# Patient Record
Sex: MALE | Race: OTHER | HISPANIC OR LATINO | URBAN - METROPOLITAN AREA
[De-identification: names, ages, dates, MRNs, and addresses within clinical notes are randomized per-mention and may not be internally consistent; named-entity substitution may affect disease eponyms.]

---

## 2022-04-20 ENCOUNTER — EMERGENCY (EMERGENCY)
Facility: HOSPITAL | Age: 42
LOS: 1 days | Discharge: HOME | End: 2022-04-20
Admitting: EMERGENCY MEDICINE
Payer: SELF-PAY

## 2022-04-20 VITALS
SYSTOLIC BLOOD PRESSURE: 120 MMHG | HEIGHT: 69 IN | OXYGEN SATURATION: 98 % | DIASTOLIC BLOOD PRESSURE: 74 MMHG | RESPIRATION RATE: 18 BRPM | HEART RATE: 80 BPM | WEIGHT: 190.04 LBS | TEMPERATURE: 97 F
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
## 2022-04-20 DIAGNOSIS — M79.674 PAIN IN RIGHT TOE(S): ICD-10-CM

## 2022-04-20 DIAGNOSIS — S91.211A LACERATION WITHOUT FOREIGN BODY OF RIGHT GREAT TOE WITH DAMAGE TO NAIL, INITIAL ENCOUNTER: ICD-10-CM

## 2022-04-20 DIAGNOSIS — Z23 ENCOUNTER FOR IMMUNIZATION: ICD-10-CM
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
## 2022-04-20 DIAGNOSIS — S92.421A DISPLACED FRACTURE OF DISTAL PHALANX OF RIGHT GREAT TOE, INITIAL ENCOUNTER FOR CLOSED FRACTURE: ICD-10-CM

## 2022-04-20 PROCEDURE — 28490 TREAT BIG TOE FRACTURE: CPT | Mod: 54

## 2022-04-20 PROCEDURE — 99284 EMERGENCY DEPT VISIT MOD MDM: CPT | Mod: 25,57

## 2022-04-20 PROCEDURE — 73630 X-RAY EXAM OF FOOT: CPT | Mod: 26,RT

## 2022-04-20 PROCEDURE — 99053 MED SERV 10PM-8AM 24 HR FAC: CPT

## 2022-04-20 RX ORDER — ACETAMINOPHEN 500 MG
2 TABLET ORAL
Qty: 20 | Refills: 0
Start: 2022-04-20

## 2022-04-20 RX ORDER — TETANUS TOXOID, REDUCED DIPHTHERIA TOXOID AND ACELLULAR PERTUSSIS VACCINE, ADSORBED 5; 2.5; 8; 8; 2.5 [IU]/.5ML; [IU]/.5ML; UG/.5ML; UG/.5ML; UG/.5ML
0.5 SUSPENSION INTRAMUSCULAR ONCE
Refills: 0 | Status: COMPLETED | OUTPATIENT
Start: 2022-04-20 | End: 2022-04-20

## 2022-04-20 RX ORDER — OXYCODONE AND ACETAMINOPHEN 5; 325 MG/1; MG/1
1 TABLET ORAL ONCE
Refills: 0 | Status: DISCONTINUED | OUTPATIENT
Start: 2022-04-20 | End: 2022-04-20

## 2022-04-20 RX ADMIN — OXYCODONE AND ACETAMINOPHEN 1 TABLET(S): 5; 325 TABLET ORAL at 04:18

## 2022-04-20 RX ADMIN — TETANUS TOXOID, REDUCED DIPHTHERIA TOXOID AND ACELLULAR PERTUSSIS VACCINE, ADSORBED 0.5 MILLILITER(S): 5; 2.5; 8; 8; 2.5 SUSPENSION INTRAMUSCULAR at 04:18

## 2022-04-20 RX ADMIN — Medication 1 TABLET(S): at 05:20

## 2022-04-20 NOTE — ED PROVIDER NOTE - OBJECTIVE STATEMENT
40 yo M with no known PMHx, last tetanus unknown, presenting c/o R great toe pain and injury x 1 hr.  Pt reports feeling frustrated and kicked a chair last night, sustained injury to the R great toe with bleeding and discoloration of the nail subsequently.  Now with pain, difficulty weight bearing and persistent bleeding. Denies head trauma, LOC, focal weakness, trauma to other sites, numbness, tingling, CP, SOB, N/V, change in ROM and malaise.

## 2022-04-20 NOTE — ED PROVIDER NOTE - PATIENT PORTAL LINK FT
You can access the FollowMyHealth Patient Portal offered by [facility] by registering at the following website: [email or web address]. By joining [employer]’s FollowMyHealth portal, you will also be able to view your health information using other applications (apps) compatible with our system.

## 2022-04-20 NOTE — ED ADULT TRIAGE NOTE - CHIEF COMPLAINT QUOTE
walk in pt with complaints of injury to right great toe after kicking a wooden chair. +dried blood and discoloration to nail.

## 2022-04-20 NOTE — ED PROCEDURE NOTE - CPROC ED POST PROC CARE GUIDE1
Verbal/written post procedure instructions were given to patient/caregiver./Instructed patient/caregiver to follow-up with primary care physician./Instructed patient/caregiver regarding signs and symptoms of infection./Elevate the injured extremity as instructed./Keep the cast/splint/dressing clean and dry.
Verbal/written post procedure instructions were given to patient/caregiver./Instructed patient/caregiver to follow-up with primary care physician./Instructed patient/caregiver regarding signs and symptoms of infection./Elevate the injured extremity as instructed./Keep the cast/splint/dressing clean and dry.
Verbal/written post procedure instructions were given to patient/caregiver./Instructed patient/caregiver regarding signs and symptoms of infection./Keep the cast/splint/dressing clean and dry.

## 2022-04-20 NOTE — ED PROCEDURE NOTE - PROCEDURE ADDITIONAL DETAILS
pt with small puncture laceration underneath distal nailbed with active bleeding, wound soaked and extensively irrigated, proximal nailbed appears intact otherwise, adequate bleeding controlled with direct pressure and steristrips, bacitracin applied and wound parul ace wrapped and surgical shoe provided

## 2022-04-20 NOTE — ED PROVIDER NOTE - PHYSICAL EXAMINATION
Gen - WDWN M, NAD, comfortable and non-toxic appearing  Skin - warm, dry,   HEENT - AT/NC, no nasal discharge, airway patent, neck supple and FROM  CV - S1S2, R/R/R  Resp - CTAB, no r/r/w  GI - soft, ND, NT, no CVAT b/l   MS - No midline spinal tenderness or step off on palpation  Neuro - AxOx3, ambulatory with mild limp on the R Gen - WDWN M, NAD, comfortable and non-toxic appearing  Skin - warm, dry, 0.5cm puncture laceration to the distal portion of the nailbed with subungual hematoma and mild active bleeding, proximal part of the nailbed grossly intact, no purulent dc, fluctuance, or FB noted   HEENT - AT/NC, no nasal discharge, airway patent, neck supple and FROM  CV - S1S2, R/R/R  Resp - CTAB, no r/r/w  GI - soft, ND, NT, no CVAT b/l   MS - R foot with wound per skin section, TTP over distal phalanx and mild erythema/edema, no ecchymosis, crepitus, deformity or laxity, NV intact, FROM, +SILT, symmetric distal pulses, No midline spinal tenderness or step off on palpation  Neuro - AxOx3, ambulatory with mild limp on the R

## 2022-04-20 NOTE — ED PROVIDER NOTE - CLINICAL SUMMARY MEDICAL DECISION MAKING FREE TEXT BOX
pt with small puncture laceration underneath distal nailbed with active bleeding s/p kicked a chair last night, wound soaked and extensively irrigated, proximal nailbed appears intact otherwise, adequate bleeding controlled with direct pressure and steristrips, bacitracin applied and wound parul ace wrapped and surgical shoe provided, xray with possible non displaced fx of the distal toe, weight bear as tolerated, course of duricef, tetanus updated, strict return precautions discussed, prompt f/u with podiatry, pt verbalized understanding

## 2022-04-20 NOTE — ED PROVIDER NOTE - NSFOLLOWUPINSTRUCTIONS_ED_ALL_ED_FT
Nail Bed Injury       The nail bed is the soft tissue under a fingernail or toenail. The nail bed can be injured in different ways. You may:  •Get bruising or bleeding under the nail.      •Get cuts in the nail or nail bed.      •Lose all or part of the nail.      After your nail is hurt or torn off, it can take many months to grow again. The nail may not grow back normally.      What are the causes?    This condition is usually caused by crushing, pinching, cutting, or tearing injuries of the fingertip or toe. These injuries might happen when a finger or toe gets:   •Caught in a door.      •Hit by a hammer.      •Damaged in accidents while you are using power tools or machinery.        What are the signs or symptoms?    Symptoms vary depending on the type of injury. Symptoms may include:  •Pain in the injured area.       •Bleeding.       •Swelling.       •A change in color of the area.      •Collection of blood under the nail (hematoma).    •Damage to the nail, such as:   •A deformed or split nail.      •A loose nail that is not stuck to the nail bed.       •Loss of all or part of the nail.          How is this treated?    Treatment may depend on the type of injury. Some injuries may not need any treatment other than keeping the area clean and free of infection. Treatment may include:  •Draining blood from under the nail. This can be done by making a small hole in the nail.      •Removing all or part of your nail. This might be done in order to stitch (suture) any cut in the nail bed.      •Stitching a torn-off nail back in place.      •Putting bandages (dressings) or splints on the area.    •Taking medicines, such as:  •Antibiotic medicine to help prevent infection.      •Pain medicine.        •Having a tetanus shot. This may be needed if you have not had a tetanus shot in the last 10 years.        Follow these instructions at home:    Managing pain, stiffness, and swelling     •Raise (elevate) the injured area above the level of your heart while you are sitting or lying down.      •Keep your injury protected with bandages or splints as told by your doctor.    •For an injured toenail:  •Try not to walk on your injured leg.      •Wear an open-toed shoe when you walk.      •Try not to let your leg hang down (dangle) when you are sitting or lying down.        Wound care     Follow any wound care instructions given by your doctor. Make sure you:  •Keep the injured area clean.      •Keep any bandages clean and dry.      •Wash your hands with soap and water for at least 20 seconds before and after you change any bandage. If you cannot use soap and water, use hand .      •Change or take off the bandage only as told by your doctor.      •Leave any stitches in place. These may need to stay in place for 2 weeks.    •Check the injured area every day for signs of infection. Check for:  •More redness, swelling, or pain.      •More fluid or blood.      •Warmth.      •Pus or a bad smell.        General instructions    •Take over-the-counter and prescription medicines only as told by your doctor.      •If you were prescribed an antibiotic medicine, use it as told by your doctor. Do not stop using the antibiotic even if you start to feel better.      •Ask your doctor if the medicine prescribed to you requires you to avoid driving or using machinery.      •Keep all follow-up visits as told by your doctor. This is important.        Contact a doctor if:    •Medicine does not help your pain.    •You have any of these problems in the injured area:  •More redness.      •More pain or swelling.      •More fluid or blood coming from the area.      •The area feels warm to the touch.      •Pus or a bad smell coming from the area.        •You have a fever and your symptoms get worse.        Get help right away if:    •You lose feeling (have numbness) in your finger or toe.      •Your finger or toe turns blue.        Summary    •The nail bed is the soft tissue under a fingernail or toenail.      •Sometimes, after a nail or nail bed is injured, the nail may not grow back normally.      •Raise (elevate) the injured area above the level of your heart while you are sitting or lying down.      •Keep any bandages clean and dry. Change or take them off only as told by your doctor.      •Take over-the-counter and prescription medicines only as told by your doctor.    You have a fracture of the right great toe     A splint has been placed to temporarily immobilize and protect the injured area.      A splint is a temporary cast that   • allows for room for expected swelling   • reduces pain by protecting and supporting the injured area  • is NOT waterproofed  • should NOT be removed unless instructed to do so     FRACTURE CARE  • Swelling of the injured area is common during the first few days.  Elevate your splint above the level of your heart frequently to reduce swelling   • Apply ice covered with a thin towel to the splint for 20 minutes every 2 hours while awake to reduce swelling and pain   • Keep your splint clean and dry at all times.  If it becomes wet, dry it with a hair dryer ONLY on the COOL setting   • Do not apply powder or lotion on or near the splint   • Do not pull the padding out from inside your splint   • Do not place anything inside the splint, even for itchy areas.  Sticking items inside can injure the skin and lead to infection   • Do not put weight on injured site unless cleared by your provider     PLEASE SEEK MEDICAL ATTENTION OR RETURN TO ER IMMEDIATELY IF:  * You have severe pain or pain that is getting worse without relief from medications   * You have sores or cuts on the skin under the splint   * You are unable to move your fingers or toes   * Your fingers or toes are blue or cold   * Your splint becomes soaking wet or you are unable to dry it   * Your splint has foul odor, feels too tight, cracks, or becomes grossly soiled  * Fever >100.4F     *** PLEASE FOLLOW UP WITH ORTHOPEDIST / PODIATRIST IN 7 DAYS ***

## 2022-04-20 NOTE — ED PROVIDER NOTE - NSFOLLOWUPCLINICS_GEN_ALL_ED_FT
[facility]  Podiatry  [address] [place] [address]  Phone: [phone]  Fax:

## 2022-04-24 ENCOUNTER — EMERGENCY (EMERGENCY)
Facility: HOSPITAL | Age: 42
LOS: 1 days | Discharge: HOME | End: 2022-04-24
Admitting: EMERGENCY MEDICINE
Payer: SELF-PAY

## 2022-04-24 VITALS
TEMPERATURE: 98 F | OXYGEN SATURATION: 97 % | SYSTOLIC BLOOD PRESSURE: 117 MMHG | WEIGHT: 190.04 LBS | RESPIRATION RATE: 16 BRPM | DIASTOLIC BLOOD PRESSURE: 74 MMHG | HEART RATE: 80 BPM | HEIGHT: 68 IN

## 2022-04-24 DIAGNOSIS — M79.674 PAIN IN RIGHT TOE(S): ICD-10-CM

## 2022-04-24 PROBLEM — Z78.9 OTHER SPECIFIED HEALTH STATUS: Chronic | Status: ACTIVE | Noted: 2022-04-20

## 2022-04-24 PROCEDURE — 99282 EMERGENCY DEPT VISIT SF MDM: CPT

## 2022-04-24 NOTE — ED PROVIDER NOTE - OBJECTIVE STATEMENT
42 yo male here for wound check of right big toe, sustained injury 4 days ago, seen in this ED, xrays neg for fracture. patient with minimal pain, no drainage. no fever or chills. patient requesting wound dressing change. patient did not  antibiotics from the pharmacy or establish followup with podiatry.

## 2022-04-24 NOTE — ED PROVIDER NOTE - NSFOLLOWUPINSTRUCTIONS_ED_ALL_ED_FT
Please  your antibiotics from [address] and start taking them immediately.   Take Ibuprofen 600mg every 6-8 hours as needed for pain, take with food, and in addition you may take Tylenol 500 mg every 6-8 hours as needed for pain  Rest. Leg elevation. Wear surgical shoe    It is important to follow up with foot specialist/podiatry, please make an appointment and follow up with them    RETURN TO THE EMERGENCY DEPARTMENT FOR WORSENING PAIN, SWELLING, PUS, FEVER, REDNESS, OR ANY CONCERNS Please  your antibiotics from [address] and start taking them immediately.   Your pain medications are waiting for you at the pharmacy as well  Rest. Leg elevation. Wear surgical shoe  Change bandage dressing daily    It is important to follow up with foot specialist/podiatry, please make an appointment and follow up with them    RETURN TO THE EMERGENCY DEPARTMENT FOR WORSENING PAIN, SWELLING, PUS, FEVER, REDNESS, OR ANY CONCERNS

## 2022-04-24 NOTE — ED PROVIDER NOTE - CLINICAL SUMMARY MEDICAL DECISION MAKING FREE TEXT BOX
here for wound check to right big toe. wound dressing removed. dried blood to nailbed with maceration to lateral toe otherwise does not appear to be infected. discussed at length wound care, leg elevation and importance of taking antibiotics and follow up with podiatry, patient agrees with plan. return precautions discussed.

## 2022-04-24 NOTE — ED PROVIDER NOTE - NSFOLLOWUPCLINICS_GEN_ALL_ED_FT
[facility]  Podiatry  [address] [place] [address]  Phone: [phone]  Fax:

## 2022-04-24 NOTE — ED PROVIDER NOTE - PATIENT PORTAL LINK FT
You can access the FollowMyHealth Patient Portal offered by [facility] by registering at the following website: [email or web address]. By joining [employer]’s FollowMyHealth portal, you will also be able to view your health information using other applications (apps) compatible with our system.

## 2022-04-24 NOTE — ED PROVIDER NOTE - PHYSICAL EXAMINATION
CONSTITUTIONAL: Well-appearing; well-nourished; in no apparent distress.   	HEAD: Normocephalic; atraumatic.   	RLE: mild swelling with maceration to right big toe laterally. no discharge/pus. dried blood at nail fold.  good cap refill. minimal ttp. no erythema. no streaking. able to move all toes. dpi. soft compartments. ambulatory.   PSYCHOLOGICAL: The patient’s mood and manner are appropriate.